# Patient Record
Sex: FEMALE | Race: BLACK OR AFRICAN AMERICAN | NOT HISPANIC OR LATINO | URBAN - METROPOLITAN AREA
[De-identification: names, ages, dates, MRNs, and addresses within clinical notes are randomized per-mention and may not be internally consistent; named-entity substitution may affect disease eponyms.]

---

## 2025-01-07 ENCOUNTER — APPOINTMENT (OUTPATIENT)
Dept: RADIOLOGY | Facility: CLINIC | Age: 50
End: 2025-01-07

## 2025-01-07 DIAGNOSIS — Z80.3 FAMILY HISTORY OF MALIGNANT NEOPLASM OF BREAST: ICD-10-CM

## 2025-01-07 DIAGNOSIS — N60.12 DIFFUSE CYSTIC MASTOPATHY OF LEFT BREAST: ICD-10-CM

## 2025-01-07 DIAGNOSIS — N60.11 DIFFUSE CYSTIC MASTOPATHY OF RIGHT BREAST: ICD-10-CM

## 2025-01-07 DIAGNOSIS — R92.30 DENSE BREASTS, UNSPECIFIED: ICD-10-CM

## 2025-01-07 PROCEDURE — 2550000001 HC RX 255 CONTRASTS

## 2025-01-07 PROCEDURE — A9575 INJ GADOTERATE MEGLUMI 0.1ML: HCPCS

## 2025-01-07 PROCEDURE — 6100000003 BI MR BREAST BILATERAL WITH CONTRAST FAST SCREENING SELF PAY

## 2025-01-07 RX ORDER — GADOTERATE MEGLUMINE 376.9 MG/ML
15 INJECTION INTRAVENOUS
Status: COMPLETED | OUTPATIENT
Start: 2025-01-07 | End: 2025-01-07

## 2025-01-07 RX ADMIN — GADOTERATE MEGLUMINE 15 ML: 376.9 INJECTION INTRAVENOUS at 10:35

## 2025-01-15 ENCOUNTER — APPOINTMENT (OUTPATIENT)
Dept: RADIOLOGY | Facility: HOSPITAL | Age: 50
End: 2025-01-15

## 2025-05-29 ENCOUNTER — OFFICE VISIT (OUTPATIENT)
Dept: URGENT CARE | Age: 50
End: 2025-05-29
Payer: COMMERCIAL

## 2025-05-29 VITALS
TEMPERATURE: 97.9 F | DIASTOLIC BLOOD PRESSURE: 80 MMHG | SYSTOLIC BLOOD PRESSURE: 129 MMHG | RESPIRATION RATE: 14 BRPM | OXYGEN SATURATION: 97 % | HEART RATE: 75 BPM

## 2025-05-29 DIAGNOSIS — S39.012A LOW BACK STRAIN, INITIAL ENCOUNTER: Primary | ICD-10-CM

## 2025-05-29 PROCEDURE — 1036F TOBACCO NON-USER: CPT | Performed by: NURSE PRACTITIONER

## 2025-05-29 PROCEDURE — 99203 OFFICE O/P NEW LOW 30 MIN: CPT | Performed by: NURSE PRACTITIONER

## 2025-05-29 RX ORDER — METHYLPREDNISOLONE 4 MG/1
TABLET ORAL
Qty: 21 TABLET | Refills: 0 | Status: SHIPPED | OUTPATIENT
Start: 2025-05-29

## 2025-05-29 RX ORDER — CYCLOBENZAPRINE HCL 10 MG
5 TABLET ORAL NIGHTLY PRN
Qty: 10 TABLET | Refills: 0 | Status: SHIPPED | OUTPATIENT
Start: 2025-05-29

## 2025-05-29 RX ORDER — IBUPROFEN 800 MG/1
800 TABLET, FILM COATED ORAL EVERY 8 HOURS PRN
Qty: 30 TABLET | Refills: 0 | Status: SHIPPED | OUTPATIENT
Start: 2025-05-29

## 2025-05-29 RX ORDER — AMLODIPINE BESYLATE 5 MG/1
5 TABLET ORAL DAILY
COMMUNITY

## 2025-05-29 ASSESSMENT — ENCOUNTER SYMPTOMS
WEAKNESS: 0
BACK PAIN: 1
NUMBNESS: 0

## 2025-05-29 NOTE — LETTER
May 29, 2025     Patient: Anjali Galicia   YOB: 1975   Date of Visit: 5/29/2025       To Whom It May Concern:    Anjali Galicia was seen in my clinic on 5/29/2025 at 10:25 am. Please excuse Anjali for her absence from work on this day to make the appointment.    If you have any questions or concerns, please don't hesitate to call.         Sincerely,         Nitza Salas, APRN-CNP        CC: No Recipients

## 2025-05-29 NOTE — PROGRESS NOTES
Subjective   Patient ID: Anjali Galicia is a 50 y.o. female. They present today with a chief complaint of Back Pain (X 6 days. TD-MA).    History of Present Illness    Back Pain  Pertinent negatives include no numbness or weakness.       Patient presents to urgent care for complaints of low back pain for 6 days. She states she was reaching over  something in her car and felt a pain. She reports she has been using ibuprofen, which has been helping to take the edge off. Denies numbness, weakness, or loss of bowel/bladder.         Past Medical History  Allergies as of 05/29/2025    (No Known Allergies)       Prescriptions Prior to Admission[1]     Medical History[2]    Surgical History[3]     reports that she has never smoked. She has never used smokeless tobacco.    Review of Systems  Review of Systems   Musculoskeletal:  Positive for back pain.   Neurological:  Negative for weakness and numbness.                                  Objective    Vitals:    05/29/25 1106   BP: 129/80   Pulse: 75   Resp: 14   Temp: 36.6 °C (97.9 °F)   SpO2: 97%     No LMP recorded.    Physical Exam  Vitals reviewed.   Constitutional:       Appearance: Normal appearance.   Cardiovascular:      Rate and Rhythm: Normal rate and regular rhythm.      Heart sounds: Normal heart sounds.   Pulmonary:      Effort: Pulmonary effort is normal.      Breath sounds: Normal breath sounds.   Musculoskeletal:      Cervical back: Normal.      Thoracic back: Normal.      Lumbar back: Spasms and tenderness present. No swelling, signs of trauma or bony tenderness. Decreased range of motion. Negative right straight leg raise test and negative left straight leg raise test.   Neurological:      Mental Status: She is alert.         Procedures    Point of Care Test & Imaging Results from this visit  No results found for this visit on 05/29/25.   Imaging  No results found.    Cardiology, Vascular, and Other Imaging  No other imaging results found for the past 2  days      Diagnostic study results (if any) were reviewed by TAYLOR Berumen.    Assessment/Plan   Allergies, medications, history, and pertinent labs/EKGs/Imaging reviewed by TAYLOR Berumen.     Medical Decision Making  Will start patient on medrol dosepak and flexeril. You may continue to use ibuprofen as needed. She was told if symptoms are not improving or worsening in the next week, follow up with PCP or with ortho if needed.    At time of discharge patient was clinically well-appearing and HDS for outpatient management. The patient and/or family was educated regarding diagnosis, supportive care, OTC and Rx medications. The patient and/or family was given the opportunity to ask questions prior to discharge.  They verbalized understanding of my discussion of the plans for treatment, expected course, indications to return to  or seek further evaluation in ED, and the need for timely follow up as directed.   They were provided with a work/school excuse if requested.      Orders and Diagnoses  Diagnoses and all orders for this visit:  Low back strain, initial encounter  -     methylPREDNISolone (Medrol Dospak) 4 mg tablets; Take as directed on package.  -     ibuprofen 800 mg tablet; Take 1 tablet (800 mg) by mouth every 8 hours if needed for mild pain (1 - 3).  -     cyclobenzaprine (Flexeril) 10 mg tablet; Take 0.5 tablets (5 mg) by mouth as needed at bedtime for muscle spasms.  -     Referral to Orthopedics and Sports Medicine; Future      Medical Admin Record      Patient disposition: Home    Electronically signed by TAYLOR Berumen  11:50 AM           [1] (Not in a hospital admission)  [2] No past medical history on file.  [3] No past surgical history on file.